# Patient Record
Sex: MALE | Race: WHITE | NOT HISPANIC OR LATINO | URBAN - METROPOLITAN AREA
[De-identification: names, ages, dates, MRNs, and addresses within clinical notes are randomized per-mention and may not be internally consistent; named-entity substitution may affect disease eponyms.]

---

## 2017-05-05 ENCOUNTER — OFFICE VISIT - RIVER FALLS (OUTPATIENT)
Dept: FAMILY MEDICINE | Facility: CLINIC | Age: 27
End: 2017-05-05

## 2017-05-05 ASSESSMENT — MIFFLIN-ST. JEOR: SCORE: 2012.26

## 2022-02-12 VITALS
OXYGEN SATURATION: 98 % | HEART RATE: 109 BPM | WEIGHT: 226 LBS | HEIGHT: 71 IN | DIASTOLIC BLOOD PRESSURE: 78 MMHG | BODY MASS INDEX: 31.64 KG/M2 | SYSTOLIC BLOOD PRESSURE: 124 MMHG

## 2022-02-16 NOTE — PROGRESS NOTES
Patient:   SANDRA CRUZ            MRN: [id number]            FIN: [id number]               Age:   27 years     Sex:  Male     :  1990   Associated Diagnoses:   's permit physical examination   Author:   Chandra Brown PA-C      Visit Information      Date of Service: 2017 02:39 pm  Performing Location: [facility]  Encounter#: [id number]      Primary Care Provider (PCP):  John Montejo MD    NPI# [id number]      Referring Provider:  No referring provider recorded for selected visit.   Visit type:  DOT exam.    Accompanied by:  No one.    Source of history:  Self.    Referral source:  Self.       Chief Complaint   2017 2:42 PM CDT     DOT physical     Here for DOT exam.      Well Adult History   Well Adult History   See scanned DOT form for history..        Review of Systems   Constitutional:  Negative.    Eye:  Negative.    Ear/Nose/Mouth/Throat:  Negative.    Respiratory:  Negative.    Cardiovascular:  Negative.    Gastrointestinal:  Negative.    Genitourinary:  Negative.    Hematology/Lymphatics:  Negative.    Endocrine:  Negative.    Immunologic:  Negative.    Musculoskeletal:  Negative.    Integumentary:  Negative.    Neurologic:  Negative.    Psychiatric:  Negative.    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  Comments:  2010 CST 11:29 AM CST -      Family History:    No  family history items have been selected or recorded.   Procedure history:    No active procedure history items have been selected or recorded.   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  Highest education level: None.  School concerns:               Learning, Communication.      Home and Environment Assessment            Marital status: Single.  Lives with Father.  Living situation: Home/Independent.      Exercise and Physical Activity Assessment: Regular exercise            Exercise frequency: 5-6 times/week.        Physical Examination   Vital Signs   5/5/2017 2:42 PM CDT Peripheral Pulse Rate 109 bpm  HI    Systolic Blood Pressure 124 mmHg    Diastolic Blood Pressure 78 mmHg    Mean Arterial Pressure 93 mmHg    Oxygen Saturation 98 %      Measurements from flowsheet : Measurements   5/5/2017 2:42 PM CDT Height Measured - Standard 71 in    Weight Measured - Standard 226 lb    BSA 2.26 m2    Body Mass Index 31.52 kg/m2      General:  Alert and oriented, No acute distress.    Eye:  Pupils are equal, round and reactive to light, Extraocular movements are intact, Normal conjunctiva, Vision unchanged.    HENT:  Normocephalic, Tympanic membranes are clear, Normal hearing, Oral mucosa is moist, No pharyngeal erythema.    Neck:  Supple, Non-tender, No lymphadenopathy.    Respiratory:  Lungs are clear to auscultation, Respirations are non-labored, Breath sounds are equal.    Cardiovascular:  Normal rate, Regular rhythm, No murmur.    Gastrointestinal:  Soft, Non-tender, Non-distended, Normal bowel sounds, No organomegaly.    Genitourinary:  No costovertebral angle tenderness.    Musculoskeletal:  Normal range of motion, Normal strength, No tenderness, Normal gait.    Integumentary:  Warm, Moist, No rash.    Neurologic:  Alert, Oriented, No focal  deficits, Normal deep tendon reflexes, Romberg is negative..    Psychiatric:  Cooperative, Appropriate mood & affect.       Health Maintenance      Recommendations     Pending (in the next year)        Due            Alcohol Misuse Screen (Male) due  05/05/17  and every 1  year(s)           Depression Screen (Male) due  05/05/17  and every 1  year(s)           HIV Screen (if sexually active) (Male) due  05/05/17  and every 1  year(s)           STD Counseling (if sexually active) (Male) due  05/05/17  and every 1  year(s)           Syphilis Screen (if sexually active) (Male) due  05/05/17  and every 1  year(s)        Due In Future            Tetanus Vaccine not due until  04/03/18  and every 10  year(s)     Satisfied (in the past 1 year)        Satisfied            Body Mass Index Check (Male) on  05/05/17.           High Blood Pressure Screen (Male) on  05/05/17.           Obesity Screen and Counseling (Male) on  05/05/17.           Tobacco Use Screen (Male) on  05/05/17.        Review / Management   Results review:  See UA report..       Impression and Plan   Diagnosis     's permit physical examination (KBG12-SH Z02.4).     Cleared for two years.